# Patient Record
Sex: MALE | Race: BLACK OR AFRICAN AMERICAN | Employment: FULL TIME | URBAN - METROPOLITAN AREA
[De-identification: names, ages, dates, MRNs, and addresses within clinical notes are randomized per-mention and may not be internally consistent; named-entity substitution may affect disease eponyms.]

---

## 2018-10-27 ENCOUNTER — ED HISTORICAL/CONVERTED ENCOUNTER (OUTPATIENT)
Dept: OTHER | Age: 28
End: 2018-10-27

## 2022-02-21 ENCOUNTER — APPOINTMENT (OUTPATIENT)
Dept: CT IMAGING | Age: 32
End: 2022-02-21
Attending: PHYSICIAN ASSISTANT
Payer: COMMERCIAL

## 2022-02-21 ENCOUNTER — HOSPITAL ENCOUNTER (EMERGENCY)
Age: 32
Discharge: HOME | End: 2022-02-21
Payer: COMMERCIAL

## 2022-02-21 VITALS
OXYGEN SATURATION: 96 % | HEIGHT: 73 IN | TEMPERATURE: 97.9 F | WEIGHT: 270 LBS | RESPIRATION RATE: 16 BRPM | BODY MASS INDEX: 35.78 KG/M2 | DIASTOLIC BLOOD PRESSURE: 82 MMHG | SYSTOLIC BLOOD PRESSURE: 122 MMHG | HEART RATE: 73 BPM

## 2022-02-21 DIAGNOSIS — R10.84 ABDOMINAL PAIN, GENERALIZED: ICD-10-CM

## 2022-02-21 DIAGNOSIS — R55 VASOVAGAL EPISODE: Primary | ICD-10-CM

## 2022-02-21 LAB
ALBUMIN SERPL-MCNC: 3.8 G/DL (ref 3.5–5)
ALBUMIN/GLOB SERPL: 1 {RATIO} (ref 1.1–2.2)
ALP SERPL-CCNC: 77 U/L (ref 45–117)
ALT SERPL-CCNC: 40 U/L (ref 12–78)
ANION GAP SERPL CALC-SCNC: 9 MMOL/L (ref 5–15)
AST SERPL W P-5'-P-CCNC: 22 U/L (ref 15–37)
ATRIAL RATE: 80 BPM
BASOPHILS # BLD: 0 K/UL (ref 0–0.1)
BASOPHILS NFR BLD: 0 % (ref 0–1)
BILIRUB SERPL-MCNC: 0.4 MG/DL (ref 0.2–1)
BUN SERPL-MCNC: 16 MG/DL (ref 6–20)
BUN/CREAT SERPL: 15 (ref 12–20)
CA-I BLD-MCNC: 8.8 MG/DL (ref 8.5–10.1)
CALCULATED P AXIS, ECG09: 26 DEGREES
CALCULATED R AXIS, ECG10: 1 DEGREES
CALCULATED T AXIS, ECG11: 1 DEGREES
CHLORIDE SERPL-SCNC: 106 MMOL/L (ref 97–108)
CK SERPL-CCNC: 224 U/L (ref 39–308)
CO2 SERPL-SCNC: 27 MMOL/L (ref 21–32)
CREAT SERPL-MCNC: 1.04 MG/DL (ref 0.7–1.3)
DIAGNOSIS, 93000: NORMAL
DIFFERENTIAL METHOD BLD: ABNORMAL
EOSINOPHIL # BLD: 0.1 K/UL (ref 0–0.4)
EOSINOPHIL NFR BLD: 1 % (ref 0–7)
ERYTHROCYTE [DISTWIDTH] IN BLOOD BY AUTOMATED COUNT: 12.5 % (ref 11.5–14.5)
GLOBULIN SER CALC-MCNC: 3.9 G/DL (ref 2–4)
GLUCOSE SERPL-MCNC: 93 MG/DL (ref 65–100)
HCT VFR BLD AUTO: 45.4 % (ref 36.6–50.3)
HGB BLD-MCNC: 16.8 G/DL (ref 12.1–17)
IMM GRANULOCYTES # BLD AUTO: 0 K/UL (ref 0–0.04)
IMM GRANULOCYTES NFR BLD AUTO: 0 % (ref 0–0.5)
LYMPHOCYTES # BLD: 2 K/UL (ref 0.8–3.5)
LYMPHOCYTES NFR BLD: 26 % (ref 12–49)
MCH RBC QN AUTO: 30.8 PG (ref 26–34)
MCHC RBC AUTO-ENTMCNC: 37 G/DL (ref 30–36.5)
MCV RBC AUTO: 83.3 FL (ref 80–99)
MONOCYTES # BLD: 0.7 K/UL (ref 0–1)
MONOCYTES NFR BLD: 8 % (ref 5–13)
NEUTS SEG # BLD: 5 K/UL (ref 1.8–8)
NEUTS SEG NFR BLD: 65 % (ref 32–75)
P-R INTERVAL, ECG05: 216 MS
PLATELET # BLD AUTO: 246 K/UL (ref 150–400)
PMV BLD AUTO: 10.3 FL (ref 8.9–12.9)
POTASSIUM SERPL-SCNC: 4 MMOL/L (ref 3.5–5.1)
PROT SERPL-MCNC: 7.7 G/DL (ref 6.4–8.2)
Q-T INTERVAL, ECG07: 364 MS
QRS DURATION, ECG06: 110 MS
QTC CALCULATION (BEZET), ECG08: 419 MS
RBC # BLD AUTO: 5.45 M/UL (ref 4.1–5.7)
SODIUM SERPL-SCNC: 142 MMOL/L (ref 136–145)
TROPONIN-HIGH SENSITIVITY: 7 NG/L (ref 0–76)
VENTRICULAR RATE, ECG03: 80 BPM
WBC # BLD AUTO: 7.8 K/UL (ref 4.1–11.1)

## 2022-02-21 PROCEDURE — 99283 EMERGENCY DEPT VISIT LOW MDM: CPT

## 2022-02-21 PROCEDURE — 70450 CT HEAD/BRAIN W/O DYE: CPT

## 2022-02-21 PROCEDURE — 82550 ASSAY OF CK (CPK): CPT

## 2022-02-21 PROCEDURE — 80053 COMPREHEN METABOLIC PANEL: CPT

## 2022-02-21 PROCEDURE — 85025 COMPLETE CBC W/AUTO DIFF WBC: CPT

## 2022-02-21 PROCEDURE — 93005 ELECTROCARDIOGRAM TRACING: CPT

## 2022-02-21 PROCEDURE — 84484 ASSAY OF TROPONIN QUANT: CPT

## 2022-02-21 PROCEDURE — 74011250636 HC RX REV CODE- 250/636: Performed by: PHYSICIAN ASSISTANT

## 2022-02-21 RX ADMIN — SODIUM CHLORIDE 1000 ML: 9 INJECTION, SOLUTION INTRAVENOUS at 12:55

## 2022-02-21 NOTE — Clinical Note
[address] [facility] EMERGENCY DEPARTMENT  [place] Sow [address]  [phone]    Work/School Note    Date: 2/21/2022    To Whom It May concern:    Yonny Mendez was seen and treated today in the emergency room by the following provider(s):  Physician Assistant: JOSUÉ Antunez. Yonny Mendez is excused from work/school on 02/21/22 and 02/22/22. He is medically clear to return to work/school on 2/23/2022.        Sincerely,          JOSUÉ Greenfield

## 2022-02-21 NOTE — ED PROVIDER NOTES
EMERGENCY DEPARTMENT HISTORY AND PHYSICAL EXAM      Date: 2/21/2022  Patient Name: Nataliya Vela    History of Presenting Illness     Chief Complaint   Patient presents with    Abdominal Pain    Headache       History Provided By: Patient    HPI: Nataliya Vela, 32 y.o. male with no significant medical hx who presents to the ED with cc of episode of syncope which occurred last night around 9 PM. Patient states that he went to have a bowel movement around 9 PM and when he was unable to have a bowel movement, he got up and next thing he remembers he was getting up off the floor and thinks he passed out. States that his mother who lives with him heard him fall. Patient is unsure if he hit his head. Unsure how long he was out for. Patient also complaining of diffuse abdominal pain exacerbated with movement. Patients states it feels like he did a workout which hurt his abdomen but states he has not changed any physical activity recently no other associated symptoms. Patient has history of cardiac disease. Denies any fever, chills, nausea, vomiting, diarrhea, headache, neck pain, urinary symptoms. There are no other complaints, changes, or physical findings at this time. PCP: None    No current facility-administered medications on file prior to encounter. No current outpatient medications on file prior to encounter. Past History     Past Medical History:  History reviewed. No pertinent past medical history. Past Surgical History:  History reviewed. No pertinent surgical history. Family History:  History reviewed. No pertinent family history. Social History:  Social History     Tobacco Use    Smoking status: Current Every Day Smoker    Smokeless tobacco: Never Used   Substance Use Topics    Alcohol use: Yes    Drug use: Not Currently       Allergies:  No Known Allergies      Review of Systems     Review of Systems   Constitutional: Negative for chills, fatigue and fever.    HENT: Negative. Respiratory: Negative for cough, chest tightness, shortness of breath and wheezing. Cardiovascular: Negative for chest pain and palpitations. Gastrointestinal: Positive for abdominal pain. Negative for diarrhea, nausea and vomiting. Genitourinary: Negative for frequency and urgency. Musculoskeletal: Negative for back pain, neck pain and neck stiffness. Skin: Negative for rash. Neurological: Positive for syncope. Negative for dizziness, weakness, light-headedness and headaches. Psychiatric/Behavioral: Negative. All other systems reviewed and are negative. Physical Exam     Physical Exam  Vitals and nursing note reviewed. Constitutional:       General: He is not in acute distress. Appearance: Normal appearance. He is well-developed. He is not ill-appearing, toxic-appearing or diaphoretic. HENT:      Head: Normocephalic and atraumatic. Nose: Nose normal. No congestion or rhinorrhea. Mouth/Throat:      Mouth: Mucous membranes are moist.      Pharynx: Oropharynx is clear. No oropharyngeal exudate or posterior oropharyngeal erythema. Eyes:      General: No scleral icterus. Conjunctiva/sclera: Conjunctivae normal.      Pupils: Pupils are equal, round, and reactive to light. Cardiovascular:      Rate and Rhythm: Normal rate and regular rhythm. Pulses:           Radial pulses are 2+ on the right side and 2+ on the left side. Dorsalis pedis pulses are 2+ on the right side and 2+ on the left side. Heart sounds: No murmur heard. No friction rub. No gallop. Pulmonary:      Effort: Pulmonary effort is normal. No tachypnea, accessory muscle usage, respiratory distress or retractions. Breath sounds: Normal breath sounds. No stridor. No decreased breath sounds, wheezing, rhonchi or rales. Chest:      Chest wall: No tenderness. Abdominal:      General: Bowel sounds are normal. There is no distension. Palpations: Abdomen is soft.  There is no mass. Tenderness: There is no abdominal tenderness. There is no right CVA tenderness, left CVA tenderness, guarding or rebound. Comments: No reproducible abd pain. Pain diffusely in abdomen only with movement   Musculoskeletal:         General: No deformity. Normal range of motion. Cervical back: Normal range of motion and neck supple. No rigidity. No muscular tenderness. Right lower leg: No edema. Left lower leg: No edema. Skin:     General: Skin is warm and dry. Capillary Refill: Capillary refill takes less than 2 seconds. Coloration: Skin is not jaundiced or pale. Findings: No bruising, erythema or rash. Neurological:      General: No focal deficit present. Mental Status: He is alert and oriented to person, place, and time. Mental status is at baseline. Sensory: Sensation is intact. Motor: Motor function is intact. Psychiatric:         Mood and Affect: Mood normal.         Behavior: Behavior normal. Behavior is cooperative. Thought Content: Thought content normal.         Judgment: Judgment normal.         Lab and Diagnostic Study Results     Labs -  Recent Results (from the past 24 hour(s))   CBC WITH AUTOMATED DIFF    Collection Time: 02/21/22 12:53 PM   Result Value Ref Range    WBC 7.8 4.1 - 11.1 K/uL    RBC 5.45 4.10 - 5.70 M/uL    HGB 16.8 12.1 - 17.0 g/dL    HCT 45.4 36.6 - 50.3 %    MCV 83.3 80.0 - 99.0 FL    MCH 30.8 26.0 - 34.0 PG    MCHC 37.0 (H) 30.0 - 36.5 g/dL    RDW 12.5 11.5 - 14.5 %    PLATELET 320 306 - 515 K/uL    MPV 10.3 8.9 - 12.9 FL    NEUTROPHILS 65 32 - 75 %    LYMPHOCYTES 26 12 - 49 %    MONOCYTES 8 5 - 13 %    EOSINOPHILS 1 0 - 7 %    BASOPHILS 0 0 - 1 %    IMMATURE GRANULOCYTES 0 0.0 - 0.5 %    ABS. NEUTROPHILS 5.0 1.8 - 8.0 K/UL    ABS. LYMPHOCYTES 2.0 0.8 - 3.5 K/UL    ABS. MONOCYTES 0.7 0.0 - 1.0 K/UL    ABS. EOSINOPHILS 0.1 0.0 - 0.4 K/UL    ABS. BASOPHILS 0.0 0.0 - 0.1 K/UL    ABS. IMM.  GRANS. 0.0 0.00 - 0.04 K/UL    DF AUTOMATED     METABOLIC PANEL, COMPREHENSIVE    Collection Time: 02/21/22 12:53 PM   Result Value Ref Range    Sodium 142 136 - 145 mmol/L    Potassium 4.0 3.5 - 5.1 mmol/L    Chloride 106 97 - 108 mmol/L    CO2 27 21 - 32 mmol/L    Anion gap 9 5 - 15 mmol/L    Glucose 93 65 - 100 mg/dL    BUN 16 6 - 20 mg/dL    Creatinine 1.04 0.70 - 1.30 mg/dL    BUN/Creatinine ratio 15 12 - 20      GFR est AA >60 >60 ml/min/1.73m2    GFR est non-AA >60 >60 ml/min/1.73m2    Calcium 8.8 8.5 - 10.1 mg/dL    Bilirubin, total 0.4 0.2 - 1.0 mg/dL    AST (SGOT) 22 15 - 37 U/L    ALT (SGPT) 40 12 - 78 U/L    Alk. phosphatase 77 45 - 117 U/L    Protein, total 7.7 6.4 - 8.2 g/dL    Albumin 3.8 3.5 - 5.0 g/dL    Globulin 3.9 2.0 - 4.0 g/dL    A-G Ratio 1.0 (L) 1.1 - 2.2     CK    Collection Time: 02/21/22 12:53 PM   Result Value Ref Range     39 - 308 U/L   TROPONIN-HIGH SENSITIVITY    Collection Time: 02/21/22 12:53 PM   Result Value Ref Range    Troponin-High Sensitivity 7 0 - 76 ng/L       Radiologic Studies -   CT HEAD WO CONT   Final Result   No intracranial hemorrhage. Medical Decision Making   - I am the first provider for this patient. - I reviewed the vital signs, available nursing notes, past medical history, past surgical history, family history and social history. - Initial assessment performed. The patients presenting problems have been discussed, and they are in agreement with the care plan formulated and outlined with them. I have encouraged them to ask questions as they arise throughout their visit. Vital Signs-Reviewed the patient's vital signs. Patient Vitals for the past 24 hrs:   Temp Pulse Resp BP SpO2   02/21/22 1257  73 16 122/82 96 %   02/21/22 1111 97.9 °F (36.6 °C) 83 18 (!) 147/90 97 %       EKG interpretation: (Preliminary) 1240  Rhythm: sinus rhythm with 1st degree AV block; and regular .  Rate (approx.): 80; Axis: normal; P wave: normal; QRS interval: normal ; ST/T wave: normal; , QTc 419      Records Reviewed: Nursing Notes and Old Medical Records    The patient presents with syncope, abdominal pain with a differential diagnosis of musculoskeletal abdominal pain, gastritis, gastroenteritis, vasovagal syncope, ACS, ICH      ED Course:          Orders Placed This Encounter    CT HEAD WO CONT     Standing Status:   Standing     Number of Occurrences:   1     Order Specific Question:   Transport     Answer:   Stretcher [5]     Order Specific Question:   Reason for Exam     Answer:   syncope     Order Specific Question:   Decision Support Exception     Answer:   Emergency Medical Condition (MA) [1]    CBC WITH AUTOMATED DIFF     Standing Status:   Standing     Number of Occurrences:   1    COMPREHENSIVE METABOLIC PANEL     Standing Status:   Standing     Number of Occurrences:   1    CK     Standing Status:   Standing     Number of Occurrences:   1    TROPONIN-HIGH SENSITIVITY     Standing Status:   Standing     Number of Occurrences:   1    EKG, 12 LEAD, INITIAL     Standing Status:   Standing     Number of Occurrences:   1     Order Specific Question:   Reason for Exam:     Answer:   syncope    sodium chloride 0.9 % bolus infusion 1,000 mL       Provider Notes (Medical Decision Making):     MDM  Number of Diagnoses or Management Options  Abdominal pain, generalized  Vasovagal episode  Diagnosis management comments:     35-year-old male with episode of syncope and abdominal pain. Based on history, it seems as though patient had a vasovagal episode after having a bowel movement. Basic labs within normal limits. EKG nonischemic. Troponin negative. Head CT negative for acute process. Patient not having any symptoms in the ED. Stable vitals. Do not think further work-up needed at this time. Educated patient on vasovagal syncope. Patient also having abdominal pain but only with movement, not with palpation. Do not think CT scan abdomen warranted at this time. Discussed with patient monitoring symptoms and return to ED if needed. Recommended patient staying well-hydrated. Follow-up PCP as an outpatient       Amount and/or Complexity of Data Reviewed  Clinical lab tests: ordered and reviewed  Tests in the radiology section of CPT®: ordered and reviewed  Review and summarize past medical records: yes    Patient Progress  Patient progress: stable           Disposition   Disposition: DC- Adult Discharges: All of the diagnostic tests were reviewed and questions answered. Diagnosis, care plan and treatment options were discussed. The patient understands the instructions and will follow up as directed. The patients results have been reviewed with them. They have been counseled regarding their diagnosis. The patient verbally convey understanding and agreement of the signs, symptoms, diagnosis, treatment and prognosis and additionally agrees to follow up as recommended with their PCP in 24 - 48 hours. They also agree with the care-plan and convey that all of their questions have been answered. I have also put together some discharge instructions for them that include: 1) educational information regarding their diagnosis, 2) how to care for their diagnosis at home, as well a 3) list of reasons why they would want to return to the ED prior to their follow-up appointment, should their condition change. Discharged    DISCHARGE PLAN:  1. There are no discharge medications for this patient. 2.   Follow-up Information     Follow up With Specialties Details Why Contact Info    Primary Care Provider  In 2 days As needed     [address] [facility]  As needed, If symptoms worsen [address] [facility] [address] [phone]        3. Return to ED if worse   4. There are no discharge medications for this patient. Diagnosis     Clinical Impression:   1. Vasovagal episode    2.  Abdominal pain, generalized Attestations:    JOSUÉ Benito    Please note that this dictation was completed with [email or web address], the computer voice recognition software. Quite often unanticipated grammatical, syntax, homophones, and other interpretive errors are inadvertently transcribed by the computer software. Please disregard these errors. Please excuse any errors that have escaped final proofreading. Thank you.

## 2022-02-21 NOTE — ED TRIAGE NOTES
Pt c/o abdominal pain and headache since last night; pt thinks that he passed out last night after he used the restroom; ambulated without difficulty or assistance

## 2022-02-21 NOTE — DISCHARGE INSTRUCTIONS
Thank you! Thank you for allowing me to care for you in the emergency department. I sincerely hope that you are satisfied with your visit today. It is my goal to provide you with excellent care. Below you will find a list of your labs and imaging from your visit today. Should you have any questions regarding these results please do not hesitate to call the emergency department. Labs -     Recent Results (from the past 12 hour(s))   CBC WITH AUTOMATED DIFF    Collection Time: 02/21/22 12:53 PM   Result Value Ref Range    WBC 7.8 4.1 - 11.1 K/uL    RBC 5.45 4.10 - 5.70 M/uL    HGB 16.8 12.1 - 17.0 g/dL    HCT 45.4 36.6 - 50.3 %    MCV 83.3 80.0 - 99.0 FL    MCH 30.8 26.0 - 34.0 PG    MCHC 37.0 (H) 30.0 - 36.5 g/dL    RDW 12.5 11.5 - 14.5 %    PLATELET 308 820 - 541 K/uL    MPV 10.3 8.9 - 12.9 FL    NEUTROPHILS 65 32 - 75 %    LYMPHOCYTES 26 12 - 49 %    MONOCYTES 8 5 - 13 %    EOSINOPHILS 1 0 - 7 %    BASOPHILS 0 0 - 1 %    IMMATURE GRANULOCYTES 0 0.0 - 0.5 %    ABS. NEUTROPHILS 5.0 1.8 - 8.0 K/UL    ABS. LYMPHOCYTES 2.0 0.8 - 3.5 K/UL    ABS. MONOCYTES 0.7 0.0 - 1.0 K/UL    ABS. EOSINOPHILS 0.1 0.0 - 0.4 K/UL    ABS. BASOPHILS 0.0 0.0 - 0.1 K/UL    ABS. IMM. GRANS. 0.0 0.00 - 0.04 K/UL    DF AUTOMATED     METABOLIC PANEL, COMPREHENSIVE    Collection Time: 02/21/22 12:53 PM   Result Value Ref Range    Sodium 142 136 - 145 mmol/L    Potassium 4.0 3.5 - 5.1 mmol/L    Chloride 106 97 - 108 mmol/L    CO2 27 21 - 32 mmol/L    Anion gap 9 5 - 15 mmol/L    Glucose 93 65 - 100 mg/dL    BUN 16 6 - 20 mg/dL    Creatinine 1.04 0.70 - 1.30 mg/dL    BUN/Creatinine ratio 15 12 - 20      GFR est AA >60 >60 ml/min/1.73m2    GFR est non-AA >60 >60 ml/min/1.73m2    Calcium 8.8 8.5 - 10.1 mg/dL    Bilirubin, total 0.4 0.2 - 1.0 mg/dL    AST (SGOT) 22 15 - 37 U/L    ALT (SGPT) 40 12 - 78 U/L    Alk.  phosphatase 77 45 - 117 U/L    Protein, total 7.7 6.4 - 8.2 g/dL    Albumin 3.8 3.5 - 5.0 g/dL    Globulin 3.9 2.0 - 4.0 g/dL A-G Ratio 1.0 (L) 1.1 - 2.2     CK    Collection Time: 02/21/22 12:53 PM   Result Value Ref Range     39 - 308 U/L   TROPONIN-HIGH SENSITIVITY    Collection Time: 02/21/22 12:53 PM   Result Value Ref Range    Troponin-High Sensitivity 7 0 - 76 ng/L       Radiologic Studies -   CT HEAD WO CONT   Final Result   No intracranial hemorrhage. CT Results  (Last 48 hours)                 02/21/22 1256  CT HEAD WO CONT Final result    Impression:  No intracranial hemorrhage. Narrative:  CT head, nonenhanced. Axial images are reviewed along with reformatted sagittal/coronal images. Dose reduction: All CT scans at this facility are performed using dose reduction   optimization techniques as appropriate to a performed exam including the   following-   automated exposure control, adjustments of mA and/or Kv according to patient   size, or use of iterative reconstructive technique. Review of bone windows reveals normal aeration of the visualized sinuses and   mastoid air cells. Intracranial contents reveal normal gray-white differentiation. No midline shift   or mass effect upon any portions of the brain. There is no intracranial   hemorrhage. CXR Results  (Last 48 hours)      None               If you feel that you have not received excellent quality care or timely care, please ask to speak to the nurse manager. Please choose us in the future for your continued health care needs. ------------------------------------------------------------------------------------------------------------  The exam and treatment you received in the Emergency Department were for an urgent problem and are not intended as complete care. It is important that you follow-up with a doctor, nurse practitioner, or physician assistant to:  (1) confirm your diagnosis,  (2) re-evaluation of changes in your illness and treatment, and  (3) for ongoing care.   If your symptoms become worse or you do not improve as expected and you are unable to reach your usual health care provider, you should return to the Emergency Department. We are available 24 hours a day. Please take your discharge instructions with you when you go to your follow-up appointment. If you have any problem arranging a follow-up appointment, contact the Emergency Department immediately. If a prescription has been provided, please have it filled as soon as possible to prevent a delay in treatment. Read the entire medication instruction sheet provided to you by the pharmacy. If you have any questions or reservations about taking the medication due to side effects or interactions with other medications, please call your primary care physician or contact the ER to speak with the charge nurse. Make an appointment with your family doctor or the physician you were referred to for follow-up of this visit as instructed on your discharge paperwork, as this is a mandatory follow-up. Return to the ER if you are unable to be seen or if you are unable to be seen in a timely manner. If you have any problem arranging the follow-up visit, contact the Emergency Department immediately.

## 2023-10-23 ENCOUNTER — HOSPITAL ENCOUNTER (EMERGENCY)
Facility: HOSPITAL | Age: 33
Discharge: HOME | End: 2023-10-23
Attending: EMERGENCY MEDICINE

## 2023-10-23 VITALS
HEIGHT: 74 IN | BODY MASS INDEX: 39.78 KG/M2 | SYSTOLIC BLOOD PRESSURE: 120 MMHG | HEART RATE: 87 BPM | WEIGHT: 310 LBS | DIASTOLIC BLOOD PRESSURE: 60 MMHG | OXYGEN SATURATION: 97 % | RESPIRATION RATE: 16 BRPM | TEMPERATURE: 98.9 F

## 2023-10-23 DIAGNOSIS — R42 VERTIGO: Primary | ICD-10-CM

## 2023-10-23 LAB
ALBUMIN SERPL-MCNC: 4.7 G/DL (ref 3.5–5.2)
ALBUMIN/GLOB SERPL: 1.3 (ref 1.1–2.2)
ALP SERPL-CCNC: 87 U/L (ref 40–129)
ALT SERPL-CCNC: 47 U/L (ref 10–50)
ANION GAP SERPL CALC-SCNC: 10 MMOL/L (ref 5–15)
AST SERPL-CCNC: 21 U/L (ref 10–50)
BASOPHILS # BLD: 0 K/UL (ref 0–0.1)
BASOPHILS NFR BLD: 0 % (ref 0–1)
BILIRUB SERPL-MCNC: 0.8 MG/DL (ref 0.2–1)
BUN SERPL-MCNC: 10 MG/DL (ref 6–20)
BUN/CREAT SERPL: 12 (ref 12–20)
CALCIUM SERPL-MCNC: 9.8 MG/DL (ref 8.6–10)
CHLORIDE SERPL-SCNC: 104 MMOL/L (ref 98–107)
CO2 SERPL-SCNC: 26 MMOL/L (ref 22–29)
CREAT SERPL-MCNC: 0.85 MG/DL (ref 0.7–1.2)
DIFFERENTIAL METHOD BLD: ABNORMAL
EOSINOPHIL # BLD: 0 K/UL (ref 0–0.4)
EOSINOPHIL NFR BLD: 1 % (ref 0–7)
ERYTHROCYTE [DISTWIDTH] IN BLOOD BY AUTOMATED COUNT: 12.4 % (ref 11.5–14.5)
GLOBULIN SER CALC-MCNC: 3.5 G/DL (ref 2–4)
GLUCOSE SERPL-MCNC: 104 MG/DL (ref 65–100)
HCT VFR BLD AUTO: 48.7 % (ref 36.6–50.3)
HGB BLD-MCNC: 17.7 G/DL (ref 12.1–17)
IMM GRANULOCYTES # BLD AUTO: 0 K/UL (ref 0–0.04)
IMM GRANULOCYTES NFR BLD AUTO: 0 % (ref 0–0.5)
LYMPHOCYTES # BLD: 2.3 K/UL (ref 0.8–3.5)
LYMPHOCYTES NFR BLD: 34 % (ref 12–49)
MCH RBC QN AUTO: 30.3 PG (ref 26–34)
MCHC RBC AUTO-ENTMCNC: 36.3 G/DL (ref 30–36.5)
MCV RBC AUTO: 83.2 FL (ref 80–99)
MONOCYTES # BLD: 0.6 K/UL (ref 0–1)
MONOCYTES NFR BLD: 9 % (ref 5–13)
NEUTS SEG # BLD: 3.7 K/UL (ref 1.8–8)
NEUTS SEG NFR BLD: 56 % (ref 32–75)
NRBC # BLD: 0 K/UL (ref 0–0.01)
NRBC BLD-RTO: 0 PER 100 WBC
PLATELET # BLD AUTO: 313 K/UL (ref 150–400)
PMV BLD AUTO: 10.2 FL (ref 8.9–12.9)
POTASSIUM SERPL-SCNC: 4.4 MMOL/L (ref 3.5–5.1)
PROT SERPL-MCNC: 8.2 G/DL (ref 6.4–8.3)
RBC # BLD AUTO: 5.85 M/UL (ref 4.1–5.7)
SODIUM SERPL-SCNC: 140 MMOL/L (ref 136–145)
WBC # BLD AUTO: 6.7 K/UL (ref 4.1–11.1)

## 2023-10-23 PROCEDURE — 80053 COMPREHEN METABOLIC PANEL: CPT

## 2023-10-23 PROCEDURE — 36415 COLL VENOUS BLD VENIPUNCTURE: CPT

## 2023-10-23 PROCEDURE — 93005 ELECTROCARDIOGRAM TRACING: CPT | Performed by: EMERGENCY MEDICINE

## 2023-10-23 PROCEDURE — 85025 COMPLETE CBC W/AUTO DIFF WBC: CPT

## 2023-10-23 PROCEDURE — 99284 EMERGENCY DEPT VISIT MOD MDM: CPT

## 2023-10-23 RX ORDER — MECLIZINE HYDROCHLORIDE 25 MG/1
25 TABLET ORAL 3 TIMES DAILY PRN
Qty: 15 TABLET | Refills: 0 | Status: SHIPPED | OUTPATIENT
Start: 2023-10-23 | End: 2023-11-02

## 2023-10-23 ASSESSMENT — PAIN DESCRIPTION - ORIENTATION: ORIENTATION: ANTERIOR

## 2023-10-23 ASSESSMENT — PAIN DESCRIPTION - FREQUENCY: FREQUENCY: CONTINUOUS

## 2023-10-23 ASSESSMENT — LIFESTYLE VARIABLES
HOW OFTEN DO YOU HAVE A DRINK CONTAINING ALCOHOL: MONTHLY OR LESS
HOW MANY STANDARD DRINKS CONTAINING ALCOHOL DO YOU HAVE ON A TYPICAL DAY: 1 OR 2

## 2023-10-23 ASSESSMENT — PAIN SCALES - GENERAL: PAINLEVEL_OUTOF10: 6

## 2023-10-23 ASSESSMENT — PAIN - FUNCTIONAL ASSESSMENT: PAIN_FUNCTIONAL_ASSESSMENT: 0-10

## 2023-10-23 ASSESSMENT — PAIN DESCRIPTION - DESCRIPTORS: DESCRIPTORS: ACHING

## 2023-10-23 ASSESSMENT — PAIN DESCRIPTION - PAIN TYPE: TYPE: ACUTE PAIN

## 2023-10-23 ASSESSMENT — PAIN DESCRIPTION - LOCATION: LOCATION: ABDOMEN

## 2023-10-23 NOTE — ED TRIAGE NOTES
Pt ambulatory into ER with cc of dizziness and abdominal cramping that began 2 weeks ago. [facility] and [facility] infection. Pt reports finishing ABX rx. Pt reports meclizine was making his dizziness worse.

## 2023-10-24 LAB
EKG ATRIAL RATE: 85 BPM
EKG DIAGNOSIS: NORMAL
EKG P AXIS: 9 DEGREES
EKG P-R INTERVAL: 182 MS
EKG Q-T INTERVAL: 348 MS
EKG QRS DURATION: 90 MS
EKG QTC CALCULATION (BAZETT): 414 MS
EKG R AXIS: 30 DEGREES
EKG T AXIS: -28 DEGREES
EKG VENTRICULAR RATE: 85 BPM

## 2023-10-24 PROCEDURE — 93010 ELECTROCARDIOGRAM REPORT: CPT | Performed by: SPECIALIST

## 2023-12-11 ENCOUNTER — HOSPITAL ENCOUNTER (EMERGENCY)
Facility: HOSPITAL | Age: 33
Discharge: HOME | End: 2023-12-11
Attending: EMERGENCY MEDICINE

## 2023-12-11 VITALS
HEART RATE: 86 BPM | SYSTOLIC BLOOD PRESSURE: 157 MMHG | WEIGHT: 310 LBS | OXYGEN SATURATION: 97 % | TEMPERATURE: 97.6 F | DIASTOLIC BLOOD PRESSURE: 93 MMHG | HEIGHT: 73 IN | BODY MASS INDEX: 41.08 KG/M2 | RESPIRATION RATE: 18 BRPM

## 2023-12-11 DIAGNOSIS — K21.9 GASTROESOPHAGEAL REFLUX DISEASE WITHOUT ESOPHAGITIS: Primary | ICD-10-CM

## 2023-12-11 PROCEDURE — 6370000000 HC RX 637 (ALT 250 FOR IP): Performed by: EMERGENCY MEDICINE

## 2023-12-11 PROCEDURE — 99283 EMERGENCY DEPT VISIT LOW MDM: CPT

## 2023-12-11 RX ORDER — MAGNESIUM HYDROXIDE/ALUMINUM HYDROXICE/SIMETHICONE 120; 1200; 1200 MG/30ML; MG/30ML; MG/30ML
30 SUSPENSION ORAL
Status: COMPLETED | OUTPATIENT
Start: 2023-12-11 | End: 2023-12-11

## 2023-12-11 RX ORDER — BUTALBITAL, ACETAMINOPHEN AND CAFFEINE 50; 325; 40 MG/1; MG/1; MG/1
1 TABLET ORAL EVERY 4 HOURS PRN
Status: DISCONTINUED | OUTPATIENT
Start: 2023-12-11 | End: 2023-12-11

## 2023-12-11 RX ORDER — BUTALBITAL, ACETAMINOPHEN AND CAFFEINE 300; 40; 50 MG/1; MG/1; MG/1
1 CAPSULE ORAL EVERY 4 HOURS PRN
Qty: 30 CAPSULE | Refills: 0 | Status: SHIPPED | OUTPATIENT
Start: 2023-12-11 | End: 2023-12-16

## 2023-12-11 RX ORDER — BUTALBITAL, ACETAMINOPHEN AND CAFFEINE 50; 325; 40 MG/1; MG/1; MG/1
1 TABLET ORAL ONCE
Status: COMPLETED | OUTPATIENT
Start: 2023-12-11 | End: 2023-12-11

## 2023-12-11 RX ADMIN — BUTALBITAL, ACETAMINOPHEN, AND CAFFEINE 1 TABLET: 50; 325; 40 TABLET ORAL at 00:23

## 2023-12-11 RX ADMIN — ALUMINUM HYDROXIDE, MAGNESIUM HYDROXIDE, AND SIMETHICONE 30 ML: 200; 200; 20 SUSPENSION ORAL at 00:24

## 2023-12-11 ASSESSMENT — LIFESTYLE VARIABLES
HOW MANY STANDARD DRINKS CONTAINING ALCOHOL DO YOU HAVE ON A TYPICAL DAY: PATIENT DOES NOT DRINK
HOW OFTEN DO YOU HAVE A DRINK CONTAINING ALCOHOL: NEVER

## 2023-12-11 ASSESSMENT — PAIN SCALES - GENERAL: PAINLEVEL_OUTOF10: 5

## 2023-12-11 ASSESSMENT — PAIN - FUNCTIONAL ASSESSMENT: PAIN_FUNCTIONAL_ASSESSMENT: 0-10

## 2023-12-11 NOTE — ED PROVIDER NOTES
homophones, and other interpretive errors are inadvertently transcribed by the computer software. Please disregards these errors.  Please excuse any errors that have escaped final proofreading.)     Santa Bailey MD  12/11/23 0101

## 2025-06-13 ENCOUNTER — HOSPITAL ENCOUNTER (EMERGENCY)
Facility: HOSPITAL | Age: 35
Discharge: HOME | End: 2025-06-13
Attending: EMERGENCY MEDICINE

## 2025-06-13 ENCOUNTER — APPOINTMENT (OUTPATIENT)
Facility: HOSPITAL | Age: 35
End: 2025-06-13

## 2025-06-13 VITALS
TEMPERATURE: 98.4 F | HEART RATE: 74 BPM | DIASTOLIC BLOOD PRESSURE: 78 MMHG | SYSTOLIC BLOOD PRESSURE: 142 MMHG | RESPIRATION RATE: 11 BRPM | OXYGEN SATURATION: 96 %

## 2025-06-13 VITALS
BODY MASS INDEX: 37.11 KG/M2 | TEMPERATURE: 98.1 F | RESPIRATION RATE: 18 BRPM | HEART RATE: 80 BPM | HEIGHT: 73 IN | OXYGEN SATURATION: 96 % | WEIGHT: 280 LBS | DIASTOLIC BLOOD PRESSURE: 94 MMHG | SYSTOLIC BLOOD PRESSURE: 150 MMHG

## 2025-06-13 DIAGNOSIS — R07.89 ATYPICAL CHEST PAIN: Primary | ICD-10-CM

## 2025-06-13 DIAGNOSIS — R07.9 CHEST PAIN, UNSPECIFIED TYPE: Primary | ICD-10-CM

## 2025-06-13 DIAGNOSIS — J18.9 PNEUMONIA OF LEFT LUNG DUE TO INFECTIOUS ORGANISM, UNSPECIFIED PART OF LUNG: ICD-10-CM

## 2025-06-13 LAB
ALBUMIN SERPL-MCNC: 4.5 G/DL (ref 3.5–5.2)
ALBUMIN/GLOB SERPL: 1.5 (ref 1.1–2.2)
ALP SERPL-CCNC: 69 U/L (ref 40–129)
ALT SERPL-CCNC: 38 U/L (ref 10–50)
ANION GAP SERPL CALC-SCNC: 14 MMOL/L (ref 2–12)
AST SERPL-CCNC: 27 U/L (ref 10–50)
BASOPHILS # BLD: 0.06 K/UL (ref 0–0.1)
BASOPHILS NFR BLD: 0.6 % (ref 0–1)
BILIRUB SERPL-MCNC: 0.5 MG/DL (ref 0.2–1)
BUN SERPL-MCNC: 11 MG/DL (ref 6–20)
BUN/CREAT SERPL: 10 (ref 12–20)
CALCIUM SERPL-MCNC: 9 MG/DL (ref 8.6–10)
CHLORIDE SERPL-SCNC: 103 MMOL/L (ref 98–107)
CO2 SERPL-SCNC: 23 MMOL/L (ref 22–29)
CREAT SERPL-MCNC: 1.08 MG/DL (ref 0.7–1.2)
D DIMER PPP FEU-MCNC: <0.27 UG/ML(FEU)
DIFFERENTIAL METHOD BLD: ABNORMAL
EOSINOPHIL # BLD: 0.11 K/UL (ref 0–0.4)
EOSINOPHIL NFR BLD: 1 % (ref 0–7)
ERYTHROCYTE [DISTWIDTH] IN BLOOD BY AUTOMATED COUNT: 12.1 % (ref 11.5–14.5)
GLOBULIN SER CALC-MCNC: 3 G/DL (ref 2–4)
GLUCOSE SERPL-MCNC: 99 MG/DL (ref 65–100)
HCT VFR BLD AUTO: 45.6 % (ref 36.6–50.3)
HGB BLD-MCNC: 17.1 G/DL (ref 12.1–17)
IMM GRANULOCYTES # BLD AUTO: 0.01 K/UL (ref 0–0.04)
IMM GRANULOCYTES NFR BLD AUTO: 0.1 % (ref 0–0.5)
LIPASE SERPL-CCNC: 20 U/L (ref 13–60)
LYMPHOCYTES # BLD: 5.88 K/UL (ref 0.8–3.5)
LYMPHOCYTES NFR BLD: 54.5 % (ref 12–49)
MCH RBC QN AUTO: 30.6 PG (ref 26–34)
MCHC RBC AUTO-ENTMCNC: 37.5 G/DL (ref 30–36.5)
MCV RBC AUTO: 81.6 FL (ref 80–99)
MONOCYTES # BLD: 0.98 K/UL (ref 0–1)
MONOCYTES NFR BLD: 9.1 % (ref 5–13)
NEUTS SEG # BLD: 3.74 K/UL (ref 1.8–8)
NEUTS SEG NFR BLD: 34.7 % (ref 32–75)
NRBC # BLD: 0 K/UL (ref 0–0.01)
NRBC BLD-RTO: 0 PER 100 WBC
NT PRO BNP: 6 PG/ML
PLATELET # BLD AUTO: 257 K/UL (ref 150–400)
PMV BLD AUTO: 10.7 FL (ref 8.9–12.9)
POTASSIUM SERPL-SCNC: 3.7 MMOL/L (ref 3.5–5.1)
PROT SERPL-MCNC: 7.5 G/DL (ref 6.4–8.3)
RBC # BLD AUTO: 5.59 M/UL (ref 4.1–5.7)
SODIUM SERPL-SCNC: 140 MMOL/L (ref 136–145)
TROPONIN I SERPL HS-MCNC: 7 NG/L (ref 0–76)
WBC # BLD AUTO: 10.8 K/UL (ref 4.1–11.1)

## 2025-06-13 PROCEDURE — 84484 ASSAY OF TROPONIN QUANT: CPT

## 2025-06-13 PROCEDURE — 99284 EMERGENCY DEPT VISIT MOD MDM: CPT

## 2025-06-13 PROCEDURE — 80053 COMPREHEN METABOLIC PANEL: CPT

## 2025-06-13 PROCEDURE — 83690 ASSAY OF LIPASE: CPT

## 2025-06-13 PROCEDURE — 36415 COLL VENOUS BLD VENIPUNCTURE: CPT

## 2025-06-13 PROCEDURE — 83880 ASSAY OF NATRIURETIC PEPTIDE: CPT

## 2025-06-13 PROCEDURE — 71046 X-RAY EXAM CHEST 2 VIEWS: CPT

## 2025-06-13 PROCEDURE — 99285 EMERGENCY DEPT VISIT HI MDM: CPT

## 2025-06-13 PROCEDURE — 93005 ELECTROCARDIOGRAM TRACING: CPT | Performed by: EMERGENCY MEDICINE

## 2025-06-13 PROCEDURE — 85379 FIBRIN DEGRADATION QUANT: CPT

## 2025-06-13 PROCEDURE — 85025 COMPLETE CBC W/AUTO DIFF WBC: CPT

## 2025-06-13 RX ORDER — AZITHROMYCIN 250 MG/1
TABLET, FILM COATED ORAL
Qty: 6 TABLET | Refills: 0 | Status: SHIPPED | OUTPATIENT
Start: 2025-06-13 | End: 2025-06-23

## 2025-06-13 RX ORDER — AMOXICILLIN 875 MG/1
875 TABLET, COATED ORAL 2 TIMES DAILY
Qty: 20 TABLET | Refills: 0 | Status: SHIPPED | OUTPATIENT
Start: 2025-06-13 | End: 2025-06-23

## 2025-06-13 ASSESSMENT — ENCOUNTER SYMPTOMS: CHEST TIGHTNESS: 1

## 2025-06-13 ASSESSMENT — PAIN DESCRIPTION - LOCATION
LOCATION: CHEST
LOCATION: CHEST

## 2025-06-13 ASSESSMENT — PAIN DESCRIPTION - ORIENTATION
ORIENTATION: MID
ORIENTATION: LEFT

## 2025-06-13 ASSESSMENT — PAIN SCALES - GENERAL
PAINLEVEL_OUTOF10: 8
PAINLEVEL_OUTOF10: 8

## 2025-06-13 ASSESSMENT — LIFESTYLE VARIABLES
HOW MANY STANDARD DRINKS CONTAINING ALCOHOL DO YOU HAVE ON A TYPICAL DAY: 1 OR 2
HOW OFTEN DO YOU HAVE A DRINK CONTAINING ALCOHOL: 2-4 TIMES A MONTH

## 2025-06-13 ASSESSMENT — PAIN DESCRIPTION - DESCRIPTORS
DESCRIPTORS: SHARP
DESCRIPTORS: SHARP;BURNING

## 2025-06-13 ASSESSMENT — PAIN - FUNCTIONAL ASSESSMENT: PAIN_FUNCTIONAL_ASSESSMENT: 0-10

## 2025-06-13 NOTE — ED TRIAGE NOTES
Patient ambulatory to Triage with c/o chest pain    Was seen at [facility] and dx with Pneumonia and prescribed ABX    Patient reports that he is coming here for a second opinion    Denies taking anything for the pain

## 2025-06-13 NOTE — ED TRIAGE NOTES
Pt presents to the ED with c/o feeling dizzy and left sided chest pain that started all of the sudden while he was driving. Denies SOB.

## 2025-06-13 NOTE — DISCHARGE INSTRUCTIONS
Take your antibiotics as prescribed during your initial ER visit.  Please follow-up with your primary care doctor.

## 2025-06-13 NOTE — ED PROVIDER NOTES
[facility] EMERGENCY DEPARTMENT  EMERGENCY DEPARTMENT ENCOUNTER      Pt Name: Jillian Mcdaniels  MRN: [id number]  Birthdate 1990  Date of evaluation: 6/13/2025  Provider: Jese Guillen MD      HISTORY OF PRESENT ILLNESS      34-year-old -American male with history of GERD previously healthy presenting to the ER for chest pain.  Patient reports yesterday he has been having some chest tightness and muscle spasms in his back and legs.  He actually went to the [place] [facility] ER earlier tonight.  He was diagnosed with incidental finding of bilateral pneumonia on x-ray.  He did not endorse any particular history of cough, fever.  He was sent home with 2 different antibiotics from [facility].  States he wanted a second opinion tonight so he came to [facility].              Nursing Notes were reviewed.    REVIEW OF SYSTEMS         Review of Systems   Respiratory:  Positive for chest tightness.    Musculoskeletal:  Positive for myalgias.           PAST MEDICAL HISTORY   No past medical history on file.      SURGICAL HISTORY     No past surgical history on file.      CURRENT MEDICATIONS       Previous Medications    AMOXICILLIN (AMOXIL) 875 MG TABLET    Take 1 tablet by mouth 2 times daily for 10 days    AZITHROMYCIN (ZITHROMAX) 250 MG TABLET    500mg on day 1 followed by 250mg on days 2 - 5    BUTALBITAL-APAP-CAFFEINE (FIORICET) -40 MG CAPS PER CAPSULE    Take 1 capsule by mouth every 4 hours as needed for Headaches       ALLERGIES     Iodine    FAMILY HISTORY     No family history on file.       SOCIAL HISTORY       Social History     Socioeconomic History    Marital status: Single   Tobacco Use    Smoking status: Some Days     Types: Cigarettes    Smokeless tobacco: Never   Substance and Sexual Activity    Alcohol use: Yes     Comment: socially    Drug use: Not Currently         PHYSICAL EXAM       ED Triage Vitals [06/13/25 0407]   BP Systolic BP Percentile Diastolic BP Percentile  ordered.  ECG/medicine tests: ordered and independent interpretation performed.     Details: EKG at 4:13 AM, normal sinus rhythm, rate of 72, no ST changes, no STEMI            REASSESSMENT          CONSULTS:  None    PROCEDURES:     Procedures            (Please note that portions of this note were completed with a voice recognition program.  Efforts were made to edit the dictations but occasionally words are mis-transcribed.)    Jese Guillen MD (electronically signed)  Emergency Attending Physician              Jese Guillen MD  06/13/25 0549

## 2025-06-13 NOTE — ED PROVIDER NOTES
[facility]  EMERGENCY DEPARTMENT ENCOUNTER      Pt Name: Jillian Mcdaniels  MRN: [id number]  Birthdate 1990  Date of evaluation: 6/13/2025  Provider: Ayo Santiago MD      HISTORY OF PRESENT ILLNESS      34-year-old male without pertinent medical history presents to the emergency department with complaint of chest pain that began while he was driving earlier this evening.  No shortness of breath and no injury.  No fever.  No cough.  Denies any cardiac or pulmonary disease.    The history is provided by the patient and medical records.           Nursing Notes were reviewed.    REVIEW OF SYSTEMS         Review of Systems        PAST MEDICAL HISTORY   No past medical history on file.      SURGICAL HISTORY     No past surgical history on file.      CURRENT MEDICATIONS       Previous Medications    BUTALBITAL-APAP-CAFFEINE (FIORICET) -40 MG CAPS PER CAPSULE    Take 1 capsule by mouth every 4 hours as needed for Headaches       ALLERGIES     Iodine    FAMILY HISTORY     No family history on file.       SOCIAL HISTORY       Social History     Socioeconomic History    Marital status: Single   Tobacco Use    Smoking status: Some Days     Types: Cigarettes    Smokeless tobacco: Never   Substance and Sexual Activity    Alcohol use: Yes     Comment: socially    Drug use: Not Currently         PHYSICAL EXAM       ED Triage Vitals [06/13/25 0122]   BP Systolic BP Percentile Diastolic BP Percentile Temp Temp Source Pulse Respirations SpO2   (!) 159/102 -- -- 98.4 °F (36.9 °C) Oral 91 18 98 %      Height Weight         -- --             There is no height or weight on file to calculate BMI.    Physical Exam  Vitals and nursing note reviewed.   Constitutional:       Appearance: He is not ill-appearing.   Cardiovascular:      Rate and Rhythm: Normal rate.   Pulmonary:      Effort: Pulmonary effort is normal. No respiratory distress.   Chest:      Chest wall: No deformity or tenderness.   Abdominal:       General: There is no distension.      Palpations: Abdomen is soft.   Neurological:      Mental Status: He is alert.             EMERGENCY DEPARTMENT COURSE and DIFFERENTIAL DIAGNOSIS/MDM:   Vitals:    Vitals:    06/13/25 0122   BP: (!) 159/102   Pulse: 91   Resp: 18   Temp: 98.4 °F (36.9 °C)   TempSrc: Oral   SpO2: 98%         Medical Decision Making  34-year-old male presents to the emergency department with chest pain.  No fever or cough.  Reassuring EKG and labs.  His chest x-ray is concerning for pneumonia.  In the absence of other symptoms we will treat with antibiotics.  Low risk for ACS, PE, PTX, dissection.  He will not require hospitalization.    Amount and/or Complexity of Data Reviewed  External Data Reviewed: notes.  Labs: ordered.  Radiology: ordered and independent interpretation performed. Decision-making details documented in ED Course.  ECG/medicine tests: ordered.            REASSESSMENT     ED Course as of 06/13/25 0227 Fri Jun 13, 2025 0128 ED EKG interpretation:  Rhythm: sinus rhythm. Rate (approx.): 88.  Axis: normal.  ST segment:  No concerning ST elevations or depressions. This EKG was interpreted by Ayo Santiago MD,ED Physician. [JM]   0144 I have independently viewed the obtained radiographic images and note chest x-ray without acute process. Will await radiology read. [JM]      ED Course User Index  [JM] Ayo Santiago MD         CONSULTS:  None    PROCEDURES:     Procedures            (Please note that portions of this note were completed with a voice recognition program.  Efforts were made to edit the dictations but occasionally words are mis-transcribed.)    Ayo Santiago MD (electronically signed)  Emergency Attending Physician               Ayo Santiago MD  06/13/25 0226

## 2025-06-14 LAB
EKG ATRIAL RATE: 72 BPM
EKG DIAGNOSIS: NORMAL
EKG P AXIS: 14 DEGREES
EKG P-R INTERVAL: 208 MS
EKG Q-T INTERVAL: 388 MS
EKG QRS DURATION: 92 MS
EKG QTC CALCULATION (BAZETT): 424 MS
EKG R AXIS: -8 DEGREES
EKG T AXIS: -14 DEGREES
EKG VENTRICULAR RATE: 72 BPM

## 2025-06-14 PROCEDURE — 93010 ELECTROCARDIOGRAM REPORT: CPT | Performed by: INTERNAL MEDICINE

## 2025-06-15 LAB
EKG ATRIAL RATE: 88 BPM
EKG DIAGNOSIS: NORMAL
EKG P AXIS: 42 DEGREES
EKG P-R INTERVAL: 226 MS
EKG Q-T INTERVAL: 372 MS
EKG QRS DURATION: 122 MS
EKG QTC CALCULATION (BAZETT): 450 MS
EKG R AXIS: -3 DEGREES
EKG T AXIS: 18 DEGREES
EKG VENTRICULAR RATE: 88 BPM

## 2025-06-15 PROCEDURE — 93010 ELECTROCARDIOGRAM REPORT: CPT | Performed by: INTERNAL MEDICINE
